# Patient Record
(demographics unavailable — no encounter records)

---

## 2024-10-08 NOTE — PLAN
[FreeTextEntry1] : Restless leg? - f/u labs - consider sleep referral pending above  - pt already taking magnesium supplement

## 2024-10-08 NOTE — HISTORY OF PRESENT ILLNESS
[FreeTextEntry8] : 35 year old male presents for 1 month of his legs moving when he sleeps at night. States when he lays down in bed as hes falling asleep his knee will jump up to his chest, mostly notices it on left side. This prevents him from falling asleep but does no wake him up at night. Does admit to poor sleep and states this has kept him up and unable to fall asleep

## 2025-02-05 NOTE — HISTORY OF PRESENT ILLNESS
[FreeTextEntry8] : Left-sided neck/trapezius pain radiating down into left arm into the forearm but not the hand.  Present for several weeks worse with rotation of the neck.  Pain more or less constant.  Patient lifts weights but not since this has occurred  Rotation of neck to the left causes left arm discomfort.  Full range of motion of shoulder.  Deep tendon reflexes 0-1+ bilaterally motor exam slight weakness of left arm however patient is right-handed  Cervical radiculopathy physical therapy celecoxib if symptoms fail to improve MRI of cervical spine ordered